# Patient Record
Sex: MALE | Race: WHITE | NOT HISPANIC OR LATINO | URBAN - METROPOLITAN AREA
[De-identification: names, ages, dates, MRNs, and addresses within clinical notes are randomized per-mention and may not be internally consistent; named-entity substitution may affect disease eponyms.]

---

## 2017-11-29 ENCOUNTER — EMERGENCY (EMERGENCY)
Facility: HOSPITAL | Age: 32
LOS: 1 days | Discharge: HOME | End: 2017-11-29
Attending: EMERGENCY MEDICINE
Payer: COMMERCIAL

## 2017-11-29 VITALS
HEART RATE: 82 BPM | HEIGHT: 70 IN | SYSTOLIC BLOOD PRESSURE: 117 MMHG | TEMPERATURE: 99 F | RESPIRATION RATE: 18 BRPM | WEIGHT: 169.98 LBS | OXYGEN SATURATION: 99 % | DIASTOLIC BLOOD PRESSURE: 65 MMHG

## 2017-11-29 VITALS
DIASTOLIC BLOOD PRESSURE: 75 MMHG | HEART RATE: 62 BPM | OXYGEN SATURATION: 100 % | RESPIRATION RATE: 18 BRPM | TEMPERATURE: 98 F | SYSTOLIC BLOOD PRESSURE: 147 MMHG

## 2017-11-29 LAB
ALBUMIN SERPL ELPH-MCNC: 4.5 G/DL — SIGNIFICANT CHANGE UP (ref 3.3–5.2)
ALP SERPL-CCNC: 41 U/L — SIGNIFICANT CHANGE UP (ref 40–120)
ALT FLD-CCNC: 13 U/L — SIGNIFICANT CHANGE UP
ANION GAP SERPL CALC-SCNC: 13 MMOL/L — SIGNIFICANT CHANGE UP (ref 5–17)
AST SERPL-CCNC: 18 U/L — SIGNIFICANT CHANGE UP
BASOPHILS # BLD AUTO: 0 K/UL — SIGNIFICANT CHANGE UP (ref 0–0.2)
BASOPHILS NFR BLD AUTO: 0.1 % — SIGNIFICANT CHANGE UP (ref 0–2)
BILIRUB SERPL-MCNC: 0.8 MG/DL — SIGNIFICANT CHANGE UP (ref 0.4–2)
BUN SERPL-MCNC: 12 MG/DL — SIGNIFICANT CHANGE UP (ref 8–20)
CALCIUM SERPL-MCNC: 9.6 MG/DL — SIGNIFICANT CHANGE UP (ref 8.6–10.2)
CHLORIDE SERPL-SCNC: 99 MMOL/L — SIGNIFICANT CHANGE UP (ref 98–107)
CO2 SERPL-SCNC: 29 MMOL/L — SIGNIFICANT CHANGE UP (ref 22–29)
CREAT SERPL-MCNC: 1.03 MG/DL — SIGNIFICANT CHANGE UP (ref 0.5–1.3)
EOSINOPHIL # BLD AUTO: 0 K/UL — SIGNIFICANT CHANGE UP (ref 0–0.5)
EOSINOPHIL NFR BLD AUTO: 0.3 % — SIGNIFICANT CHANGE UP (ref 0–5)
GLUCOSE SERPL-MCNC: 126 MG/DL — HIGH (ref 70–115)
HCT VFR BLD CALC: 40 % — LOW (ref 42–52)
HGB BLD-MCNC: 13.6 G/DL — LOW (ref 14–18)
LYMPHOCYTES # BLD AUTO: 1.5 K/UL — SIGNIFICANT CHANGE UP (ref 1–4.8)
LYMPHOCYTES # BLD AUTO: 15.9 % — LOW (ref 20–55)
MCHC RBC-ENTMCNC: 29.3 PG — SIGNIFICANT CHANGE UP (ref 27–31)
MCHC RBC-ENTMCNC: 34 G/DL — SIGNIFICANT CHANGE UP (ref 32–36)
MCV RBC AUTO: 86.2 FL — SIGNIFICANT CHANGE UP (ref 80–94)
MONOCYTES # BLD AUTO: 0.8 K/UL — SIGNIFICANT CHANGE UP (ref 0–0.8)
MONOCYTES NFR BLD AUTO: 8.7 % — SIGNIFICANT CHANGE UP (ref 3–10)
NEUTROPHILS # BLD AUTO: 6.9 K/UL — SIGNIFICANT CHANGE UP (ref 1.8–8)
NEUTROPHILS NFR BLD AUTO: 74.8 % — HIGH (ref 37–73)
PLATELET # BLD AUTO: 276 K/UL — SIGNIFICANT CHANGE UP (ref 150–400)
POTASSIUM SERPL-MCNC: 4.1 MMOL/L — SIGNIFICANT CHANGE UP (ref 3.5–5.3)
POTASSIUM SERPL-SCNC: 4.1 MMOL/L — SIGNIFICANT CHANGE UP (ref 3.5–5.3)
PROT SERPL-MCNC: 7.6 G/DL — SIGNIFICANT CHANGE UP (ref 6.6–8.7)
RAPID RVP RESULT: SIGNIFICANT CHANGE UP
RBC # BLD: 4.64 M/UL — SIGNIFICANT CHANGE UP (ref 4.6–6.2)
RBC # FLD: 12.9 % — SIGNIFICANT CHANGE UP (ref 11–15.6)
SODIUM SERPL-SCNC: 141 MMOL/L — SIGNIFICANT CHANGE UP (ref 135–145)
WBC # BLD: 9.2 K/UL — SIGNIFICANT CHANGE UP (ref 4.8–10.8)
WBC # FLD AUTO: 9.2 K/UL — SIGNIFICANT CHANGE UP (ref 4.8–10.8)

## 2017-11-29 PROCEDURE — 87633 RESP VIRUS 12-25 TARGETS: CPT

## 2017-11-29 PROCEDURE — 87798 DETECT AGENT NOS DNA AMP: CPT

## 2017-11-29 PROCEDURE — 80053 COMPREHEN METABOLIC PANEL: CPT

## 2017-11-29 PROCEDURE — 99284 EMERGENCY DEPT VISIT MOD MDM: CPT

## 2017-11-29 PROCEDURE — 36415 COLL VENOUS BLD VENIPUNCTURE: CPT

## 2017-11-29 PROCEDURE — 96375 TX/PRO/DX INJ NEW DRUG ADDON: CPT

## 2017-11-29 PROCEDURE — 96374 THER/PROPH/DIAG INJ IV PUSH: CPT

## 2017-11-29 PROCEDURE — 99284 EMERGENCY DEPT VISIT MOD MDM: CPT | Mod: 25

## 2017-11-29 PROCEDURE — 87486 CHLMYD PNEUM DNA AMP PROBE: CPT

## 2017-11-29 PROCEDURE — 85027 COMPLETE CBC AUTOMATED: CPT

## 2017-11-29 PROCEDURE — 87581 M.PNEUMON DNA AMP PROBE: CPT

## 2017-11-29 RX ORDER — METOCLOPRAMIDE HCL 10 MG
10 TABLET ORAL ONCE
Qty: 0 | Refills: 0 | Status: COMPLETED | OUTPATIENT
Start: 2017-11-29 | End: 2017-11-29

## 2017-11-29 RX ORDER — SODIUM CHLORIDE 9 MG/ML
1000 INJECTION INTRAMUSCULAR; INTRAVENOUS; SUBCUTANEOUS ONCE
Qty: 0 | Refills: 0 | Status: COMPLETED | OUTPATIENT
Start: 2017-11-29 | End: 2017-11-29

## 2017-11-29 RX ORDER — DIAZEPAM 5 MG
5 TABLET ORAL ONCE
Qty: 0 | Refills: 0 | Status: DISCONTINUED | OUTPATIENT
Start: 2017-11-29 | End: 2017-11-29

## 2017-11-29 RX ORDER — ONDANSETRON 8 MG/1
4 TABLET, FILM COATED ORAL ONCE
Qty: 0 | Refills: 0 | Status: COMPLETED | OUTPATIENT
Start: 2017-11-29 | End: 2017-11-29

## 2017-11-29 RX ADMIN — Medication 104 MILLIGRAM(S): at 19:31

## 2017-11-29 RX ADMIN — Medication 5 MILLIGRAM(S): at 17:59

## 2017-11-29 RX ADMIN — ONDANSETRON 4 MILLIGRAM(S): 8 TABLET, FILM COATED ORAL at 18:03

## 2017-11-29 RX ADMIN — SODIUM CHLORIDE 2000 MILLILITER(S): 9 INJECTION INTRAMUSCULAR; INTRAVENOUS; SUBCUTANEOUS at 18:00

## 2017-11-29 NOTE — ED STATDOCS - ATTENDING CONTRIBUTION TO CARE
I, Dr. Christopher, performed the initial face to face bedside interview with this patient regarding history of present illness, review of symptoms and relevant past medical, social and family history.  I completed an independent physical examination.  I was the initial provider who evaluated this patient. I have signed out the follow up of any pending tests (i.e. labs, radiological studies) to the ACP.  I have communicated the patient’s plan of care and disposition with the ACP.

## 2017-11-29 NOTE — ED ADULT NURSE NOTE - CHIEF COMPLAINT QUOTE
headache since Sunday and fever.  Pt had a CT of brain yesterday and states it was negative. Is on Augmention for Sinus infection.

## 2017-11-29 NOTE — ED STATDOCS - OBJECTIVE STATEMENT
33 y/o M pt presents to ED c/o a worsening HA with a fever, chills, body aches and nausea that onset 4 days ago. The pt rates his HA a 7/10. He states that it all started when he coughed and began to feel pressure on his head. His HA became unbearable two days ago. The pts HA worsens when he looks down. Pt states he has had HAs in the past. He states he had a CT done yesterday that ws negative. Took Excedrin yesterday with no relief. Currently on Augmentin since yesterday. Has been around sick contact. NKDA. Denies ear pain, V/D, fever, chills, SOB, CP, and abdominal pain. No further complaints at this time.

## 2017-11-29 NOTE — ED STATDOCS - PROGRESS NOTE DETAILS
pt states tht he's feeling better after medication PA NOTE: Pt seen by intake physician and hpi/orders/plan reviewed. PT presenting to ED with complaints of headache and body aches with low grade fever x 4 days.  patient states that he was prescribed augmentin by his pmd - took 2 days worth with no relief.  Denies vomiting.  Pt had negative CT scan yesterday  PE: GEN: Awake, alert,  NAD,  EYES: PERRL CARDIAC: Reg rate and rhythm, S1,S2, RRR  RESP: No distress noted. Lungs CTA bilaterally no wheeze, ronchi, rales. ABD: soft,  non-tender, no guarding. . NEURO: AOx3, no focal deficits   PLAN: labs, medicate

## 2017-11-29 NOTE — ED ADULT NURSE NOTE - OBJECTIVE STATEMENT
Pt c/o severe migraines, lower back and neck pain since sunday. C/o fevers, chills, insomnia. Took excedrin without relief

## 2017-11-29 NOTE — ED STATDOCS - NEUROLOGICAL, MLM
sensation is normal and strength is normal. sensation is normal and strength is normal. Normal gait.

## 2019-04-23 PROBLEM — Z00.00 ENCOUNTER FOR PREVENTIVE HEALTH EXAMINATION: Status: ACTIVE | Noted: 2019-04-23

## 2019-04-24 ENCOUNTER — APPOINTMENT (OUTPATIENT)
Dept: ORTHOPEDIC SURGERY | Facility: CLINIC | Age: 34
End: 2019-04-24
Payer: COMMERCIAL

## 2019-04-24 VITALS
WEIGHT: 170 LBS | SYSTOLIC BLOOD PRESSURE: 122 MMHG | HEIGHT: 70 IN | HEART RATE: 66 BPM | DIASTOLIC BLOOD PRESSURE: 80 MMHG | BODY MASS INDEX: 24.34 KG/M2

## 2019-04-24 DIAGNOSIS — S83.241A OTHER TEAR OF MEDIAL MENISCUS, CURRENT INJURY, RIGHT KNEE, INITIAL ENCOUNTER: ICD-10-CM

## 2019-04-24 DIAGNOSIS — Z78.9 OTHER SPECIFIED HEALTH STATUS: ICD-10-CM

## 2019-04-24 PROCEDURE — 99204 OFFICE O/P NEW MOD 45 MIN: CPT

## 2019-04-24 PROCEDURE — 73562 X-RAY EXAM OF KNEE 3: CPT

## 2019-04-24 NOTE — PHYSICAL EXAM
[Normal] : Gait: normal [LE] : Sensory: Intact in bilateral lower extremities [ALL] : Biceps, brachioradialis, triceps, patellar, ankle and plantar 2+ and symmetric bilaterally [Antalgic] : not antalgic [de-identified] : Right knee exam shows medial jointline tenderness, no lateral jointline tenderness. Pain at the adductor tubercle of the medial knee. negative Lachman's maneuver. Preserved ROM.\par Left knee exam shows negative Lachman's maneuver.  [de-identified] : GENERAL APPEARANCE: Well nourished and hydrated, pleasant, alert, and oriented x 3. Appears their stated age. \par HEENT: Normocephalic, extraocular eye motion intact. Nasal septum midline. Oral cavity clear. External auditory canal clear. \par RESPIRATORY: Breath sounds clear and audible in all lobes. No wheezing, No accessory muscle use.\par CARDIOVASCULAR: No apparent abnormalities. No lower leg edema. No varicosities. Pedal pulses are palpable.\par NEUROLOGIC: Sensation is normal, no muscle weakness in the upper or lower extremities.\par DERMATOLOGIC: No apparent skin lesions, moist, warm, no rash.\par SPINE: Cervical spine appears normal and moves freely; thoracic spine appears normal and moves freely; lumbosacral spine appears normal and moves freely, normal, nontender.\par MUSCULOSKELETAL: Hands, wrists, and elbows are normal and move freely, shoulders are normal and move freely.  [de-identified] : 3V Xray of the right knee done in office today and reviewed by Dr. Yordan Osei demonstrates well-preserved joint without findings or any evidence of fracture, dislocation, or any other acute orthopedic pathology. There is no radiographic features of severe OA present. \par \par \par

## 2019-04-24 NOTE — ADDENDUM
[FreeTextEntry1] : I, Saturnino Hunter, acted solely as a scribe for Dr. Yordan Osei on this date 04/24/2019.

## 2019-04-24 NOTE — DISCUSSION/SUMMARY
[de-identified] : 34 year male presents with right knee pain following recent injuries. We discussed the nature of the condition and the treatment options. His pain has persisted despite treatment with NSAIDs, rest, and knee brace usage. \par \par Based on his recent injuries and persistent right knee pain, I ordered an MRI of the right knee to evaluate internal derangement, rule out MCL tear or meniscus tear. \par \par The patient will follow-up to discuss MRI results when they are available. \par \par Currently he is unable to return to work, pending MRI result.
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
## 2019-09-21 ENCOUNTER — EMERGENCY (EMERGENCY)
Facility: HOSPITAL | Age: 34
LOS: 1 days | Discharge: HOME | End: 2019-09-21
Attending: EMERGENCY MEDICINE
Payer: COMMERCIAL

## 2019-09-21 VITALS
HEART RATE: 53 BPM | SYSTOLIC BLOOD PRESSURE: 116 MMHG | RESPIRATION RATE: 18 BRPM | DIASTOLIC BLOOD PRESSURE: 73 MMHG | TEMPERATURE: 98 F | OXYGEN SATURATION: 100 %

## 2019-09-21 VITALS — HEIGHT: 70 IN | WEIGHT: 169.98 LBS

## 2019-09-21 PROCEDURE — 70450 CT HEAD/BRAIN W/O DYE: CPT | Mod: 26

## 2019-09-21 PROCEDURE — 70450 CT HEAD/BRAIN W/O DYE: CPT

## 2019-09-21 PROCEDURE — 73030 X-RAY EXAM OF SHOULDER: CPT

## 2019-09-21 PROCEDURE — 99284 EMERGENCY DEPT VISIT MOD MDM: CPT

## 2019-09-21 PROCEDURE — 73030 X-RAY EXAM OF SHOULDER: CPT | Mod: 26,LT

## 2019-09-21 PROCEDURE — 73000 X-RAY EXAM OF COLLAR BONE: CPT | Mod: 26,LT

## 2019-09-21 PROCEDURE — 73000 X-RAY EXAM OF COLLAR BONE: CPT

## 2019-09-21 PROCEDURE — 99284 EMERGENCY DEPT VISIT MOD MDM: CPT | Mod: 25

## 2019-09-21 NOTE — ED STATDOCS - MUSCULOSKELETAL FINDINGS, MLM
tenderness to clavicle and left shoulder, no deformities, limited ROM due to pain, no midline cervical tenderness
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
## 2019-09-21 NOTE — ED STATDOCS - PATIENT PORTAL LINK FT
You can access the FollowMyHealth Patient Portal offered by [facility] by registering at the following website: [email or web address]. By joining [employer]’s FollowMyHealth portal, you will also be able to view your health information using other applications (apps) compatible with our system.

## 2019-09-21 NOTE — ED STATDOCS - PROGRESS NOTE DETAILS
Pts CT scan negative, Xrays left shoulder/clavicle negative. Pt with TTP over the AC joint, likely mild  shoulder. Pt stable for d/c with sling and f/u with ortho outpatient.

## 2019-09-23 ENCOUNTER — APPOINTMENT (OUTPATIENT)
Dept: ORTHOPEDIC SURGERY | Facility: CLINIC | Age: 34
End: 2019-09-23
Payer: COMMERCIAL

## 2019-09-23 VITALS
WEIGHT: 170 LBS | SYSTOLIC BLOOD PRESSURE: 109 MMHG | BODY MASS INDEX: 24.34 KG/M2 | HEIGHT: 70 IN | DIASTOLIC BLOOD PRESSURE: 71 MMHG | HEART RATE: 57 BPM

## 2019-09-23 PROCEDURE — 73000 X-RAY EXAM OF COLLAR BONE: CPT | Mod: RT

## 2019-09-23 PROCEDURE — 99214 OFFICE O/P EST MOD 30 MIN: CPT

## 2019-09-23 NOTE — DISCUSSION/SUMMARY
[de-identified] : MAXI is a 34 year old male who presents today for evaluation of left shoulder pain.  He has a history of multiple shoulder dislocations with his last dislocation being 3 years ago.  He performed physical therapy on his own and has been symptom free from that time.  Patient fell on the tip of his left shoulder on 9/21/19 and presented to [place] ED the same day.  X-rays revealed no fracture or dislocation.  He does not recall his shoulder dislocating or subluxing.  Currently, his pain is located to the lateral and superior part of his shoulder.  He denies numbness/tingling.  Patient takes Advil and Tylenol for pain and also utilizes ice.\par \par 1 view of the left and right clavicle performed today were reviewed with the pt and show no fracture. AC separation and SC subluxation noted to left shoulder with serendipity view. Otherwise unremarkable.\par \par After review of patients mechanism of injury, radiographs, based off his current symptoms and clinical exam, he is to remain within sling given to him today until next evaluation in 3 weeks. Nonweightbearing as well until further notice. We gave him a prescription

## 2019-09-23 NOTE — PHYSICAL EXAM
[de-identified] : Physical Exam:\par General: Well appearing, no acute distress\par Neurologic: A&Ox3, No focal deficits\par Head: NCAT without abrasions, lacerations, or ecchymosis to head, face, or scalp\par Eyes: No scleral icterus, no gross abnormalities\par Respiratory: Equal chest wall expansion bilaterally, no accessory muscle use\par Lymphatic: No lymphadenopathy palpated\par Skin: Warm and dry\par Psychiatric: Normal mood and affect\par \par Physical Exam:\par General: Well appearing, no acute distress\par Neurologic: A&Ox3, No focal deficits\par Head: NCAT without abrasions, lacerations, or ecchymosis to head, face, or scalp\par Eyes: No scleral icterus, no gross abnormalities\par Respiratory: Equal chest wall expansion bilaterally, no accessory muscle use\par Lymphatic: No lymphadenopathy palpated\par Skin: Warm and dry\par Psychiatric: Normal mood and affect\par \par Left Shoulder\par ·	Inspection/Palpation:Tenderness over AC joint and SC joint, no swelling or deformities, no ecchymosis\par ·	Range of Motion: unable due to pain and stiffness\par ·	Strength: unable due to pain and stiffness\par ·	Stability:  unable due to pain and stiffness\par Right Shoulder\par ·	Inspection/Palpation: no tenderness, swelling or deformities\par ·	Range of Motion: full and painless in all planes, no crepitus\par ·	Strength: forward elevation in scapular plane 5/5, internal rotation 5/5, external rotation 5/5, adduction 5/5 and abduction 5/5\par ·	Stability: no joint instability on provocative testing\par ·	Tests: Pritchard test negative, Neer sign negative, negative drop arm test secondary to pain, bear hug test negative, Napolean sign negative, cross arm adduction negative, lift off sign positive, hornblowers sign negative, speeds test negative, Yergason's test negative, no bicipital groove tenderness, Lopez's Active Compression test negative\par  [de-identified] : The following radiographs were ordered and read by me during this patients visit. I reviewed each radiograph in detail with the patient and discussed the findings as highlighted below. \par \par 2 views of the left and right clavicle show no fracture. AC separation and SC subluxation noted to left shoulder with serendipity view. Otherwise unremarkable.

## 2019-09-23 NOTE — HISTORY OF PRESENT ILLNESS
[de-identified] : MAXI is a 34 year old male who presents today for evaluation of left shoulder pain.  He has a history of multiple shoulder dislocations with his last dislocation being 3 years ago.  He performed physical therapy on his own and has been symptom free from that time.  Patient fell on the tip of his left shoulder on 9/21/19 and presented to [place] ED the same day.  X-rays revealed no fracture or dislocation.  He does not recall his shoulder dislocating or subluxing.  Currently, his pain is located to the lateral and superior part of his shoulder.  He denies numbness/tingling.  Patient takes Advil and Tylenol for pain and also utilizes ice.\par

## 2019-10-07 ENCOUNTER — APPOINTMENT (OUTPATIENT)
Dept: ORTHOPEDIC SURGERY | Facility: CLINIC | Age: 34
End: 2019-10-07
Payer: COMMERCIAL

## 2019-10-07 PROCEDURE — 73000 X-RAY EXAM OF COLLAR BONE: CPT

## 2019-10-07 PROCEDURE — 99213 OFFICE O/P EST LOW 20 MIN: CPT

## 2019-10-07 RX ORDER — MELOXICAM 7.5 MG/1
7.5 TABLET ORAL
Qty: 21 | Refills: 0 | Status: COMPLETED | COMMUNITY
Start: 2019-09-23 | End: 2019-10-28

## 2019-10-07 NOTE — PHYSICAL EXAM
[de-identified] : Physical Exam:\par General: Well appearing, no acute distress\par Neurologic: A&Ox3, No focal deficits\par Head: NCAT without abrasions, lacerations, or ecchymosis to head, face, or scalp\par Eyes: No scleral icterus, no gross abnormalities\par Respiratory: Equal chest wall expansion bilaterally, no accessory muscle use\par Lymphatic: No lymphadenopathy palpated\par Skin: Warm and dry\par Psychiatric: Normal mood and affect\par \par Physical Exam:\par General: Well appearing, no acute distress\par Neurologic: A&Ox3, No focal deficits\par Head: NCAT without abrasions, lacerations, or ecchymosis to head, face, or scalp\par Eyes: No scleral icterus, no gross abnormalities\par Respiratory: Equal chest wall expansion bilaterally, no accessory muscle use\par Lymphatic: No lymphadenopathy palpated\par Skin: Warm and dry\par Psychiatric: Normal mood and affect\par \par Left Shoulder\par ·	Inspection/Palpation:Tenderness over AC joint and SC joint, no swelling or deformities, no ecchymosis\par ·	Range of Motion: unable due to pain and stiffness\par ·	Strength: unable due to pain and stiffness\par ·	Stability:  unable due to pain and stiffness\par Right Shoulder\par ·	Inspection/Palpation: no tenderness, swelling or deformities\par ·	Range of Motion: full and painless in all planes, no crepitus\par ·	Strength: forward elevation in scapular plane 5/5, internal rotation 5/5, external rotation 5/5, adduction 5/5 and abduction 5/5\par ·	Stability: no joint instability on provocative testing\par ·	Tests: Pritchard test negative, Neer sign negative, negative drop arm test secondary to pain, bear hug test negative, Napolean sign negative, cross arm adduction negative, lift off sign positive, hornblowers sign negative, speeds test negative, Yergason's test negative, no bicipital groove tenderness, Lopez's Active Compression test negative\par  [de-identified] : The following radiographs were ordered and read by me during this patients visit. I reviewed each radiograph in detail with the patient and discussed the findings as highlighted below. \par \par 2 views of the left and right clavicle show no fracture. AC separation and SC subluxation noted to left shoulder with serendipity view. Otherwise unremarkable.

## 2019-10-07 NOTE — DISCUSSION/SUMMARY
[de-identified] : Symptoms of a fall today. Overall is doing much better. He still has weakness with full range of motion. He saw some mild pain with overhead activities. At this time I recommend physical therapy for strengthening and increase her range of motion. I gave him a note that he may return to work in 2 weeks. I will see him back in 4-6 weeks for clinical reassessment. He visit the above plan and all questions were answered.

## 2019-10-07 NOTE — HISTORY OF PRESENT ILLNESS
[de-identified] : MAXI is a 34 year old male who presents today for evaluation of left shoulder pain.  He has a history of multiple shoulder dislocations with his last dislocation being 3 years ago.  He performed physical therapy on his own and has been symptom free from that time.  Patient fell on the tip of his left shoulder on 9/21/19 and presented to [place] ED the same day.  X-rays revealed no fracture or dislocation.  He does not recall his shoulder dislocating or subluxing.  Currently, his pain is located to the lateral and superior part of his shoulder.  He denies numbness/tingling.  Patient takes Advil and Tylenol for pain and also utilizes ice.\par

## 2019-10-10 ENCOUNTER — APPOINTMENT (OUTPATIENT)
Dept: ORTHOPEDIC SURGERY | Facility: CLINIC | Age: 34
End: 2019-10-10

## 2019-10-21 ENCOUNTER — MOBILE ON CALL (OUTPATIENT)
Age: 34
End: 2019-10-21

## 2019-11-04 ENCOUNTER — RESULT REVIEW (OUTPATIENT)
Age: 34
End: 2019-11-04

## 2019-11-04 ENCOUNTER — APPOINTMENT (OUTPATIENT)
Dept: ORTHOPEDIC SURGERY | Facility: CLINIC | Age: 34
End: 2019-11-04
Payer: COMMERCIAL

## 2019-11-04 DIAGNOSIS — S43.202A UNSPECIFIED SUBLUXATION OF LEFT STERNOCLAVICULAR JOINT, INITIAL ENCOUNTER: ICD-10-CM

## 2019-11-04 PROCEDURE — 99213 OFFICE O/P EST LOW 20 MIN: CPT

## 2019-11-04 NOTE — HISTORY OF PRESENT ILLNESS
[Improving] : improving [de-identified] : MAXI is a 34 year old male who presents today for followup evaluation of left shoulder pain.  He has a history of multiple shoulder dislocations with his last dislocation being 3 years ago.  He performed physical therapy on his own and has been symptom free from that time.  Patient fell on the tip of his left shoulder on 9/21/19 and presented to [place] ED the same day.  X-rays revealed no fracture or dislocation.  He does not recall his shoulder dislocating or subluxing.  \par \par Currently the patient endorses improving medial clavicle pain.  He endorses pain with weight bearing through the left upper extremity.  His pain wraps around the outside of his shoulder and radiates to his deltoid tuberosity.He performs physical therapy.  Pt. obtained an MRI of his left shoulder and chest that reveals a nondisplaced medial clavicle fracture, a nondisplaced acromial fracture with AC ligament sprain, mild inferior glenoid cartilage fissuring and nondisplaced labral tear, and an infraspinatus tendon tear that measures 4 mm in thickness

## 2019-11-04 NOTE — PHYSICAL EXAM
[de-identified] : Physical Exam:\par General: Well appearing, no acute distress\par Neurologic: A&Ox3, No focal deficits\par Head: NCAT without abrasions, lacerations, or ecchymosis to head, face, or scalp\par Eyes: No scleral icterus, no gross abnormalities\par Respiratory: Equal chest wall expansion bilaterally, no accessory muscle use\par Lymphatic: No lymphadenopathy palpated\par Skin: Warm and dry\par Psychiatric: Normal mood and affect\par \par Physical Exam:\par General: Well appearing, no acute distress\par Neurologic: A&Ox3, No focal deficits\par Head: NCAT without abrasions, lacerations, or ecchymosis to head, face, or scalp\par Eyes: No scleral icterus, no gross abnormalities\par Respiratory: Equal chest wall expansion bilaterally, no accessory muscle use\par Lymphatic: No lymphadenopathy palpated\par Skin: Warm and dry\par Psychiatric: Normal mood and affect\par \par Left Shoulder\par ·	Inspection/Palpation:Tenderness over AC joint and SC joint, no swelling or deformities, no ecchymosis\par ·	Range of Motion: unable due to pain and stiffness\par ·	Strength: unable due to pain and stiffness\par ·	Stability: unable due to pain and stiffness\par Right Shoulder\par ·	Inspection/Palpation: no tenderness, swelling or deformities\par ·	Range of Motion: full and painless in all planes, no crepitus\par ·	Strength: forward elevation in scapular plane 5/5, internal rotation 5/5, external rotation 5/5, adduction 5/5 and abduction 5/5\par ·	Stability: no joint instability on provocative testing\par ·	Tests: Pritchard test negative, Neer sign negative, negative drop arm test secondary to pain, bear hug test negative, Napolean sign negative, cross arm adduction negative, lift off sign positive, hornblowers sign negative, speeds test negative, Yergason's test negative, no bicipital groove tenderness, Lopez's Active Compression test negative\par  [de-identified] : MRI performed at that facility is available for me to review. It shows a impaction fracture at the sternal end of the clavicle as well as the distal end of the acromion. There is a chronic partial-thickness tearing of the infraspinatus. No superior labral tear noted

## 2019-11-04 NOTE — REVIEW OF SYSTEMS
[Joint Pain] : joint pain [Joint Stiffness] : joint stiffness [Negative] : Heme/Lymph [FreeTextEntry9] : left shoulder

## 2019-11-04 NOTE — DISCUSSION/SUMMARY
[de-identified] : José Miguel comes in for followup to review his MRI. His pain is secondary to the fractures both at the sternal end of the clavicle as well as the acromion. After discussion with the patient regarding the nature of his symptoms as well as Ultram and options. We discussed operative and nonoperative management. At this time I recommend continued nonoperative care with a 2 week hiatus from physical therapy. I know was given until him that he may return to work in one week. He will have limited weight lifting restrictions of no more than 5 pounds below shoulder height and no weight above shoulder height. I will see him back in 4 weeks for clinical reassessment. He agrees with the above plan and all questions were answered.

## 2019-12-02 ENCOUNTER — APPOINTMENT (OUTPATIENT)
Dept: ORTHOPEDIC SURGERY | Facility: CLINIC | Age: 34
End: 2019-12-02
Payer: COMMERCIAL

## 2019-12-02 PROCEDURE — 99213 OFFICE O/P EST LOW 20 MIN: CPT

## 2019-12-02 NOTE — HISTORY OF PRESENT ILLNESS
[Improving] : improving [de-identified] : MAXI is a 34 year old male who presents today for followup evaluation of left shoulder pain.  He has a history of multiple shoulder dislocations with his last dislocation being 3 years ago.  He performed physical therapy on his own and has been symptom free from that time.  Patient fell on the tip of his left shoulder on 9/21/19 and presented to [facility] ED the same day.  X-rays revealed no fracture or dislocation.  He does not recall his shoulder dislocating or subluxing.  \par \par Currently the patient endorses improving medial clavicle pain. He has worsening pain going up his left lateral aspect of neck. He endorses pain with weight bearing through the left upper extremity.  His pain wraps around the outside of his shoulder and radiates to his deltoid tuberosity. He has not gone to physical therapy following his last visit due to pain.

## 2019-12-02 NOTE — PHYSICAL EXAM
[de-identified] : Physical Exam:\par General: Well appearing, no acute distress\par Neurologic: A&Ox3, No focal deficits\par Head: NCAT without abrasions, lacerations, or ecchymosis to head, face, or scalp\par Eyes: No scleral icterus, no gross abnormalities\par Respiratory: Equal chest wall expansion bilaterally, no accessory muscle use\par Lymphatic: No lymphadenopathy palpated\par Skin: Warm and dry\par Psychiatric: Normal mood and affect\par \par Left Shoulder\par ·	Inspection/Palpation: Tenderness over AC joint and SC joint, no swelling or deformities, no ecchymosis\par ·	Range of Motion: unable due to pain and stiffness\par ·	Strength: unable due to pain and stiffness\par ·	Stability: unable due to pain and stiffness\par Right Shoulder\par ·	Inspection/Palpation: no tenderness, swelling or deformities\par ·	Range of Motion: full and painless in all planes, no crepitus\par ·	Strength: forward elevation in scapular plane 5/5, internal rotation 5/5, external rotation 5/5, adduction 5/5 and abduction 5/5\par ·	Stability: no joint instability on provocative testing\par ·	Tests: Pritchard test negative, Neer sign negative, negative drop arm test secondary to pain, bear hug test negative, Napolean sign negative, cross arm adduction negative, lift off sign positive, hornblowers sign negative, speeds test negative, Yergason's test negative, no bicipital groove tenderness, Lopez's Active Compression test negative\par

## 2019-12-02 NOTE — DISCUSSION/SUMMARY
[de-identified] : She comes in for follow-up today.  Overall he is doing much better.  He states that the pain at his clavicular region has improved significantly.  He still has some pain over his trapezial and sternocleidomastoid muscles.  This is likely secondary to poor mechanics and the fact that he has not done physical therapy.  At this time given that it appears the clavicle is healing well I recommend physical therapy to retrain his rotator cuff muscles as well as trapezial muscle.  I will see him back in 6 weeks for clinical reassessment.  I also discussed with him what records he may may not do secondary to the injury and pain.  He demonstrates understanding of the plan.  All questions were answered.

## 2020-01-27 ENCOUNTER — APPOINTMENT (OUTPATIENT)
Dept: ORTHOPEDIC SURGERY | Facility: CLINIC | Age: 35
End: 2020-01-27
Payer: COMMERCIAL

## 2020-01-27 DIAGNOSIS — S43.102A UNSPECIFIED DISLOCATION OF LEFT ACROMIOCLAVICULAR JOINT, INITIAL ENCOUNTER: ICD-10-CM

## 2020-01-27 DIAGNOSIS — S42.025A NONDISPLACED FRACTURE OF SHAFT OF LEFT CLAVICLE, INITIAL ENCOUNTER FOR CLOSED FRACTURE: ICD-10-CM

## 2020-01-27 DIAGNOSIS — S83.411A SPRAIN OF MEDIAL COLLATERAL LIGAMENT OF RIGHT KNEE, INITIAL ENCOUNTER: ICD-10-CM

## 2020-01-27 PROCEDURE — 99213 OFFICE O/P EST LOW 20 MIN: CPT

## 2020-01-27 PROCEDURE — 73030 X-RAY EXAM OF SHOULDER: CPT | Mod: LT

## 2020-01-27 NOTE — HISTORY OF PRESENT ILLNESS
[Improving] : improving [de-identified] : MAXI is a 34 year old male who presents today for followup evaluation of left shoulder pain.  He has a history of multiple shoulder dislocations with his last dislocation being 3 years ago.  He performed physical therapy on his own and has been symptom free from that time.  Patient fell on the tip of his left shoulder on 9/21/19 and presented to [place] ED the same day.  X-rays revealed no fracture or dislocation.  He does not recall his shoulder dislocating or subluxing.  \par \par Currently, he is back at the gym doing his normal activities but avoiding bench pressing and denies pain unless in extreme flexion.

## 2020-01-27 NOTE — PHYSICAL EXAM
[de-identified] : Physical Exam:\par General: Well appearing, no acute distress\par Neurologic: A&Ox3, No focal deficits\par Head: NCAT without abrasions, lacerations, or ecchymosis to head, face, or scalp\par Eyes: No scleral icterus, no gross abnormalities\par Respiratory: Equal chest wall expansion bilaterally, no accessory muscle use\par Lymphatic: No lymphadenopathy palpated\par Skin: Warm and dry\par Psychiatric: Normal mood and affect\par \par Left Shoulder\par ·	Inspection/Palpation: No tenderness over AC joint and SC joint, no swelling or deformities, no ecchymosis\par ·	Range of Motion: no crepitus with ROM; Active/Passive FF 0-160; ER at side 0-45; IR to T7 \par ·	Strength: forward elevation in scapular plane [4/5], internal rotation [4/5], external rotation [4/5], adduction [4/5] and abduction [4/5]\par ·	Stability: no joint instability on provocative testing\par ·	Tests: Pritchard test negative, Neer negative, drop arm test negative, bear hug test negative, Napolean sign negative, cross arm adduction negative, lift off sign negative, hornblowers sign negative, speeds test NEG, Yergason's test NEG, no bicipital groove tenderness, Lopez's Active Compression test POS, whipple test NEG, bicep's load II test negative\par \par Right Shoulder\par ·	Inspection/Palpation: no tenderness, swelling or deformities\par ·	Range of Motion: full and painless in all planes, no crepitus\par ·	Strength: forward elevation in scapular plane 5/5, internal rotation 5/5, external rotation 5/5, adduction 5/5 and abduction 5/5\par ·	Stability: no joint instability on provocative testing\par ·	Tests: Pritchard test negative, Neer sign negative, negative drop arm test secondary to pain, bear hug test negative, Napolean sign negative, cross arm adduction negative, lift off sign positive, hornblowers sign negative, speeds test negative, Yergason's test negative, no bicipital groove tenderness, Lopez's Active Compression test negative\par  [de-identified] : The following radiographs were ordered and read by me during this patients visit. I reviewed each radiograph in detail with the patient and discussed the findings as highlighted below. \par \par AP and Serendipity views of clavicle show no fracture, with callus formation noted over the sternal end, no dislocation or bony lesions. There is no evidence of degenerative change to acromioclavicular joint with maintenance of the joint space. Otherwise unremarkable.

## 2020-01-27 NOTE — DISCUSSION/SUMMARY
[de-identified] : José Miguel comes in for follow-up of his left shoulder pain.  Overall is doing very well.  He is back to doing all activities.  He does complain of some mild stiffness of the left shoulder.  At this time I recommend continue physical therapy while doing a home exercise program on his own.  I provided him with a home exercise program as well.  I will see him back as needed.  He agrees with the above plan and all questions were answered.

## 2021-03-09 ENCOUNTER — TRANSCRIPTION ENCOUNTER (OUTPATIENT)
Age: 36
End: 2021-03-09

## 2022-03-01 ENCOUNTER — APPOINTMENT (OUTPATIENT)
Dept: PHYSICAL MEDICINE AND REHAB | Facility: CLINIC | Age: 37
End: 2022-03-01
Payer: COMMERCIAL

## 2022-03-01 VITALS
BODY MASS INDEX: 24.34 KG/M2 | HEIGHT: 70 IN | HEART RATE: 78 BPM | DIASTOLIC BLOOD PRESSURE: 84 MMHG | SYSTOLIC BLOOD PRESSURE: 120 MMHG | WEIGHT: 170 LBS | RESPIRATION RATE: 12 BRPM

## 2022-03-01 DIAGNOSIS — Z78.9 OTHER SPECIFIED HEALTH STATUS: ICD-10-CM

## 2022-03-01 DIAGNOSIS — M50.90 CERVICAL DISC DISORDER, UNSPECIFIED, UNSPECIFIED CERVICAL REGION: ICD-10-CM

## 2022-03-01 PROCEDURE — 99204 OFFICE O/P NEW MOD 45 MIN: CPT

## 2022-03-01 NOTE — HISTORY OF PRESENT ILLNESS
[FreeTextEntry1] : 37 y.o. M w/ h/o multiple left shoulder dislocations -> fall sustaining left clavicle fx and AC separation (2019) presents to office w/ c/o right shoulder and neck pain.  Pt. describes 2 week h/o pain radiating into right sided neck and down into biceps.  Denies N/T/W in right hand.  Pt. has been taking Rx NSAIDs which have been helpful.  MRI C-spine done and reviewed below.  No recent P.T. yet.  No KASH or shoulder injections.

## 2022-03-01 NOTE — PHYSICAL EXAM
[FreeTextEntry1] : NAD\par A&Ox3\par Non Obese\par Cervical ROM: 30' extension, flexion- able to touch 1 finger breadth, 60-70' b/l LR \par ROM Right Shoulder: 180' ABD/FF w/o pain, Left shoulder: 170' ABD/FF w/o pain \par DTR: 2+ b/l biceps,triceps, BR\par Duran's neg\par Neer's: neg \par Hawkin's: neg\par Scarf: neg\par Spurling: neg \par MMT: 5/5 b/l  SS; IS; D; 5/5 B/T/WrExt\par Palpation: TTP R>L upper trapezius, periscapular, cervical paraspinals \par

## 2022-03-01 NOTE — DATA REVIEWED
[MRI] : MRI [FreeTextEntry1] : MRI C-spine (Feb 2022): Mild disc bulging at C4-5 with focal central annular tear. No thecal sac compression. No neural foraminal stenosis.\par \par Left paracentral disc osteophyte complex at C5-6. Focal right paracentral annular tear. Mild thecal sac compression with flattening of left ventral spinal cord. Mild left neural foraminal stenosis.\par \par Shallow central to right paracentral disc protrusion at C6-7 with focal right paracentral annular tear. Mild thecal sac compression. No neural foraminal stenosis.\par \par Straightening of normal cervical lordosis which may reflect muscle spasm or patient positioning.

## 2022-03-01 NOTE — ASSESSMENT
[FreeTextEntry1] : 37 y.o. M w/ right sided neck and shoulder pain.  I spent most of today's office visit (35 min) reviewing the patient's MRI, discussing pathogenesis and further non-operative management.  Advised judicious use of PO NSAIDs 2' GI/cardiac/renal toxicities.  Rx P.T. for modalities, gentle ROM, stretching and strengthening exercises.  No need for KASH or shoulder injections now.  Pt. is in agreement with plan.  All questions answered.  RTC 6-8 weeks.

## 2022-03-07 ENCOUNTER — APPOINTMENT (OUTPATIENT)
Dept: ORTHOPEDIC SURGERY | Facility: CLINIC | Age: 37
End: 2022-03-07

## 2022-03-10 ENCOUNTER — APPOINTMENT (OUTPATIENT)
Dept: ORTHOPEDIC SURGERY | Facility: CLINIC | Age: 37
End: 2022-03-10

## 2022-09-22 ENCOUNTER — APPOINTMENT (OUTPATIENT)
Dept: PHYSICAL MEDICINE AND REHAB | Facility: CLINIC | Age: 37
End: 2022-09-22

## 2022-09-22 ENCOUNTER — FORM ENCOUNTER (OUTPATIENT)
Age: 37
End: 2022-09-22

## 2022-09-22 VITALS
RESPIRATION RATE: 12 BRPM | HEIGHT: 71 IN | BODY MASS INDEX: 23.8 KG/M2 | SYSTOLIC BLOOD PRESSURE: 109 MMHG | DIASTOLIC BLOOD PRESSURE: 70 MMHG | WEIGHT: 170 LBS | HEART RATE: 62 BPM

## 2022-09-22 PROCEDURE — 99214 OFFICE O/P EST MOD 30 MIN: CPT

## 2022-09-22 PROCEDURE — 99072 ADDL SUPL MATRL&STAF TM PHE: CPT

## 2022-09-22 NOTE — PHYSICAL EXAM
[FreeTextEntry1] : NAD\par A&Ox3\par Non-obese\par ROM L-spine: full forward flexion; 20' extension w/o pain\par ROM Hips: \par Pelvic tilt:\par Seated slump test: neg left\par SLR: neg left\par LICHA's: +/- left\par DTR's: 2+ knees/ankles\par MMT: 5/5 b/l LE\par Sensation:\par Toe & Heel Walk: Yes\par Palpation: left mid and upper lumbar paravertebral hypertonicity\par

## 2022-09-22 NOTE — ASSESSMENT
[FreeTextEntry1] : 37 y.o. M w/ c/o left sided LBP after a work-related injury on 8/22/22.  I spent most of today's office visit (40 min) reviewing the patient's MRI, discussing etiology, pathogenesis and further non-operative management.  MRI c/w broad-based left paracentral/foraminal HNP impinging on the left L2 NR.  Note is made of a left-sided annular tear.  Explained that imaging finding may be incidental as he does not c/o groin or medial thigh pain w/ N/T/B.  I would not recommend TFESI at this time.  Rx P.T. for modalities, gentle ROM, stretching and strengthening exercises.  Advised judicious use of PO NSAIDs.  May reserve SMR for night time use only.  Pt. will remain out of work for additional 3 weeks on FMLA leave.  Pt. is in agreement with plan.  All questions answered.  RTC 6-8 weeks.    [Indicate if, in your opinion, the incident that the patient described was the competent medical cause of this injury/illness.] : The incident that the patient described was the competent medical cause of this injury/illness: Yes [Indicate if the patient's complaints are consistent with his/her history of the injury/illness.] : Indicate if the patient's complaints are consistent with his/her history of the injury/illness: Yes [Yes] : Yes, it is consistent [Can the patient return to usual work activities as indicated? If yes, indicate date___] : The patient cannot return to usual work activities as indicated. [FreeTextEntry5] : 100 [Physical Disability Temporary Total] : temporarily total disabled

## 2022-09-22 NOTE — DATA REVIEWED
[MRI] : MRI [FreeTextEntry1] : MRI L-spine (\par \par T12-L1: There is no significant spinal canal or neural foraminal stenosis.\par \par L1-L2: There is no significant spinal canal or neural foraminal stenosis.\par \par L2-L3 : There is a broad-based left paracentral/foraminal disc protrusion\par impinging upon the left L2 nerve roots. A left-sided annular tear is noted.\par There is mild left lateral recess stenosis with disc material contacting but not\par displacing the left L3 nerve root.\par \par L3-L4 : There is no significant spinal canal or neural foraminal stenosis.\par \par L4-L5: There is a circumferential disc bulge flattening the ventral thecal sac\par and minimally narrowing the right greater than left neural foramen.\par \par L5-S1: There is no significant spinal canal or neural foraminal stenosis. Mild\par facet arthrosis is noted.\par \par The posterior paraspinal muscles are symmetric.

## 2022-09-22 NOTE — HISTORY OF PRESENT ILLNESS
[FreeTextEntry1] : 37 y.o. M presents to office w/ c/o LBP after a work-related injury on 22.  Pt. states that he was unloading an oversize pallet and acutely injured his lower back.  Pt. denies h/o LBP prior to this accident.  PMD obtained x-rays and MRI L-spine.  Results detailed below.  Pain can radiate into left posterior superior hip not down leg.  Denies N/T/W in thigh, leg or foot.  Pt. attempted to RTW light duty on 22 but was unable to work without pain.  Then took [facility] leave of absence to care for  baby.  Has been taking OTC ibuprofen and was Rx'd SMR which causes excessive daytime drowsiness.  Has not had P.T. or injections.

## 2022-09-22 NOTE — HISTORY OF PRESENT ILLNESS
[FreeTextEntry1] : 37 y.o. M presents to office w/ c/o LBP after a work-related injury on 22.  Pt. states that he was unloading an oversize pallet and acutely injured his lower back.  Pt. denies h/o LBP prior to this accident.  PMD obtained x-rays and MRI L-spine.  Results detailed below.  Pain can radiate into left posterior superior hip not down leg.  Denies N/T/W in thigh, leg or foot.  Pt. attempted to RTW light duty on 22 but was unable to work without pain.  Then took [facility] leave of absence to care for  baby.  Has been taking OTC ibuprofen and was Rx'd SMR which causes excessive daytime drowsiness.  Has not had P.T. or injections.

## 2022-10-10 ENCOUNTER — FORM ENCOUNTER (OUTPATIENT)
Age: 37
End: 2022-10-10

## 2022-10-14 ENCOUNTER — NON-APPOINTMENT (OUTPATIENT)
Age: 37
End: 2022-10-14

## 2022-10-18 ENCOUNTER — APPOINTMENT (OUTPATIENT)
Dept: PHYSICAL MEDICINE AND REHAB | Facility: CLINIC | Age: 37
End: 2022-10-18

## 2022-10-18 VITALS
WEIGHT: 170 LBS | HEART RATE: 69 BPM | HEIGHT: 71 IN | DIASTOLIC BLOOD PRESSURE: 75 MMHG | SYSTOLIC BLOOD PRESSURE: 134 MMHG | BODY MASS INDEX: 23.8 KG/M2 | RESPIRATION RATE: 14 BRPM

## 2022-10-18 PROCEDURE — 99214 OFFICE O/P EST MOD 30 MIN: CPT

## 2022-10-18 PROCEDURE — 99072 ADDL SUPL MATRL&STAF TM PHE: CPT

## 2022-10-18 NOTE — ASSESSMENT
[Indicate if, in your opinion, the incident that the patient described was the competent medical cause of this injury/illness.] : The incident that the patient described was the competent medical cause of this injury/illness: Yes [Indicate if the patient's complaints are consistent with his/her history of the injury/illness.] : Indicate if the patient's complaints are consistent with his/her history of the injury/illness: Yes [Yes] : Yes, it is consistent [Physical Disability Temporary Total] : temporarily total disabled [FreeTextEntry1] : 37 y.o. M w/ c/o left sided LBP after a work-related injury (8/22/22) w/ persistent muscular discomfort left lumbar spine.  I spent most of today's office visit (25 min) re-reviewing the patient's MRI, discussing etiology, pathogenesis and further non-operative management.  MRI previously reviewed c/w broad-based left paracentral/foraminal HNP impinging on the left L2 NR.  Note again is made of a left-sided annular tear.  Explained that imaging finding may be incidental as he does not c/o groin or medial thigh pain w/ N/T/B.  That does not mean; however, that his myofascial back pain does not need to be treated in P.T. at an optimal frequency of 2-3x/week for 6-8 weeks.  May reserve SMR for night time use only.  Pt. will remain out of work for additional 5 weeks as there are no light duty options available to him at his current job.  Pt. may require FCE to help determine RTW restriction and/or accommodations.  Pt. is in agreement with plan.  All questions answered.  RTC 5 weeks.    [Can the patient return to usual work activities as indicated? If yes, indicate date___] : The patient cannot return to usual work activities as indicated. [FreeTextEntry5] : 100

## 2022-10-18 NOTE — PHYSICAL EXAM
[FreeTextEntry1] : NAD\par A&Ox3\par Non-obese\par No significant change in today's office visit\par ROM L-spine: full forward flexion; 20' extension w/o pain\par ROM Hips: \par Pelvic tilt:\par Seated slump test: neg left\par SLR: neg left\par LICHA's: +/- left (unchanged)\par DTR's: 2+ knees/ankles\par MMT: 5/5 b/l LE\par Sensation: SILT\par Toe & Heel Walk: Yes\par Palpation: left mid and upper lumbar paravertebral hypertonicity/spasm/ttp (largely unchaged)\par

## 2022-10-18 NOTE — HISTORY OF PRESENT ILLNESS
[FreeTextEntry1] : 37 y.o. M w/ c/o left sided LBP after a work-related injury (8/22/22) returns to office for f/u.  Pt. states that his WC carrier initially approved 12 visits of P.T. now only 6 visits.  Pt. only has completed 2 courses of P.T. now.  He describes "soreness" across his lower back w/ exacerbations of pain radiating into left lateral hip w/ extremes of motion.  Denies groin or medial thigh pain.

## 2022-11-13 ENCOUNTER — FORM ENCOUNTER (OUTPATIENT)
Age: 37
End: 2022-11-13

## 2022-11-14 ENCOUNTER — APPOINTMENT (OUTPATIENT)
Dept: PHYSICAL MEDICINE AND REHAB | Facility: CLINIC | Age: 37
End: 2022-11-14

## 2022-11-14 VITALS
BODY MASS INDEX: 23.8 KG/M2 | HEIGHT: 71 IN | WEIGHT: 170 LBS | DIASTOLIC BLOOD PRESSURE: 75 MMHG | HEART RATE: 71 BPM | SYSTOLIC BLOOD PRESSURE: 119 MMHG

## 2022-11-14 PROCEDURE — 99072 ADDL SUPL MATRL&STAF TM PHE: CPT

## 2022-11-14 PROCEDURE — 99214 OFFICE O/P EST MOD 30 MIN: CPT

## 2022-11-14 NOTE — ASSESSMENT
[Indicate if, in your opinion, the incident that the patient described was the competent medical cause of this injury/illness.] : The incident that the patient described was the competent medical cause of this injury/illness: Yes [Indicate if the patient's complaints are consistent with his/her history of the injury/illness.] : Indicate if the patient's complaints are consistent with his/her history of the injury/illness: Yes [Yes] : Yes, it is consistent [Physical Disability Temporary Total] : temporarily total disabled [FreeTextEntry1] : 37 y.o. M w/ c/o left sided LBP after a work-related injury (8/22/22) w/ persistent muscular discomfort left lumbar spine.  I spent most of today's office visit (25 min) discussing etiology, pathogenesis and further non-operative management.  MRI previously reviewed c/w broad-based left paracentral/foraminal HNP impinging on the left L2 NR.  Note again is made of a left-sided annular tear.  Explained that imaging finding may be incidental as he does not c/o groin or medial thigh pain w/ N/T/B.  That does not mean; however, that his myofascial back pain does not need to be treated in P.T. at an optimal frequency of 2-3x/week for 6-8 weeks.  Pt. will remain out of work for additional 4 weeks to complete a functional capacity evaluation (FCE) to help determine RTW restriction and/or accommodations.  Pt. is in agreement with plan.  All questions answered.  RTC 4 weeks.    [Can the patient return to usual work activities as indicated? If yes, indicate date___] : The patient cannot return to usual work activities as indicated. [FreeTextEntry5] : 100

## 2022-11-14 NOTE — PHYSICAL EXAM
[FreeTextEntry1] : NAD\par A&Ox3\par Non-obese\par No significant change in today's office visit\par ROM L-spine: near full forward flexion; 15-20' extension w/ pain (new)\par ROM Hips: near full IR/ER w/o pain (tight in left hip IR)\par Pelvic tilt: minimal\par Seated slump test: neg left\par SLR: neg left\par LICHA's: +/- left (unchanged)\par DTR's: 2+ knees/ankles\par MMT: 5/5 b/l LE\par Sensation: SILT\par Toe & Heel Walk: Yes\par Palpation: left mid and upper lumbar paravertebral hypertonicity/spasm/ttp (largely unchanged)\par

## 2022-11-14 NOTE — DATA REVIEWED
[MRI] : MRI [FreeTextEntry1] : MRI L-spine \par \par T12-L1: There is no significant spinal canal or neural foraminal stenosis.\par \par L1-L2: There is no significant spinal canal or neural foraminal stenosis.\par \par L2-L3 : There is a broad-based left paracentral/foraminal disc protrusion\par impinging upon the left L2 nerve roots. A left-sided annular tear is noted.\par There is mild left lateral recess stenosis with disc material contacting but not\par displacing the left L3 nerve root.\par \par L3-L4 : There is no significant spinal canal or neural foraminal stenosis.\par \par L4-L5: There is a circumferential disc bulge flattening the ventral thecal sac\par and minimally narrowing the right greater than left neural foramen.\par \par L5-S1: There is no significant spinal canal or neural foraminal stenosis. Mild\par facet arthrosis is noted.\par \par The posterior paraspinal muscles are symmetric.

## 2022-11-14 NOTE — HISTORY OF PRESENT ILLNESS
[FreeTextEntry1] : 37 y.o. M  w/ c/o left sided LBP after a work-related injury (8/22/22) w/ persistent muscular discomfort left lumbar spine returns to office for f/u.  Pt. has not been able to go to P.T. since his last visit.  Pain is now on both sided of his lower back w/ radiation into left hip.  He has pain bending over to  his young son.  Pt. takes OTC ibuprofen which causes GI upset.  Has not been able to RTW yet.

## 2022-12-12 ENCOUNTER — APPOINTMENT (OUTPATIENT)
Dept: PHYSICAL MEDICINE AND REHAB | Facility: CLINIC | Age: 37
End: 2022-12-12

## 2022-12-12 VITALS
SYSTOLIC BLOOD PRESSURE: 137 MMHG | WEIGHT: 170 LBS | DIASTOLIC BLOOD PRESSURE: 80 MMHG | BODY MASS INDEX: 23.8 KG/M2 | HEIGHT: 71 IN | HEART RATE: 91 BPM

## 2022-12-12 PROCEDURE — 99214 OFFICE O/P EST MOD 30 MIN: CPT

## 2022-12-12 PROCEDURE — 99072 ADDL SUPL MATRL&STAF TM PHE: CPT

## 2022-12-12 NOTE — HISTORY OF PRESENT ILLNESS
[FreeTextEntry1] : 37 y.o. M w/ c/o left sided LBP after a work-related injury (8/22/22) w/ persistent muscular discomfort left lumbar spine returns to office for f/u. Pt. states that since his last visit in Nov., he has not been able to go to P.T. as our request as been denied by his WC carrier.  He describes persistent lower back "tightness" and "soreness" worse in the AM.  Pt. states that his pain is worse with bending over and lifting (ie, picking up baby daughter from crib).  Has some radiation of pain into left hip but still denies pain radiating down leg.  No N/T/B in legs.  Of note, pt. has not had FCE done but has REINIER scheduled for tomorrow.

## 2022-12-12 NOTE — PHYSICAL EXAM
[FreeTextEntry1] : NAD\par A&Ox3\par Non-obese\par No significant change in today's office visit\par ROM L-spine: near full forward flexion; 15-20' extension w/ pain (static and non-progressive)\par ROM Hips: near full IR/ER w/o pain (tight in left hip IR)\par Pelvic tilt: minimal\par Seated slump test: neg left\par SLR: neg left (static)\par LICHA's: + left (unchanged) \par DTR's: 2+ knees/ankles\par MMT: 5/5 b/l LE (unchanged)\par Sensation: SILT\par Toe & Heel Walk: Yes\par Palpation: left mid and upper lumbar paravertebral hypertonicity/spasm/ttp (largely unchanged)\par

## 2022-12-12 NOTE — ASSESSMENT
[Indicate if, in your opinion, the incident that the patient described was the competent medical cause of this injury/illness.] : The incident that the patient described was the competent medical cause of this injury/illness: Yes [Indicate if the patient's complaints are consistent with his/her history of the injury/illness.] : Indicate if the patient's complaints are consistent with his/her history of the injury/illness: Yes [Yes] : Yes, it is consistent [Physical Disability Temporary Total] : temporarily total disabled [FreeTextEntry1] : 37 y.o. M w/ c/o left sided LBP after a work-related injury (8/22/22) w/ persistent muscular discomfort left lumbar spine.  I spent most of today's office visit (25 min) discussing etiology, pathogenesis and further non-operative management.  MRI previously reviewed c/w broad-based left paracentral/foraminal HNP impinging on the left L2 NR.  Note again is made of a left-sided annular tear.  Explained that imaging finding may be incidental as he does not c/o groin or medial thigh pain w/ N/T/B.  That does not mean; however, that his myofascial back pain does not need to be treated in P.T. at an optimal frequency of 2-3x/week for 6-8 weeks.  In the meantime, we have reviewed a proper home exercise program consisting of walking in the pool for core stabilization exercises.  We reviewed proper bending, lifting and carrying techniques.  Pt. will remain out of work for additional 6 weeks as he has yet to complete a functional capacity evaluation (FCE) to help determine RTW restrictions and/or accommodations.  Pt. is in agreement with plan.  All questions answered.  RTC 6 weeks.    [Can the patient return to usual work activities as indicated? If yes, indicate date___] : The patient cannot return to usual work activities as indicated. [FreeTextEntry5] : 100

## 2022-12-13 ENCOUNTER — FORM ENCOUNTER (OUTPATIENT)
Age: 37
End: 2022-12-13

## 2022-12-21 ENCOUNTER — FORM ENCOUNTER (OUTPATIENT)
Age: 37
End: 2022-12-21

## 2023-02-03 ENCOUNTER — APPOINTMENT (OUTPATIENT)
Dept: PHYSICAL MEDICINE AND REHAB | Facility: CLINIC | Age: 38
End: 2023-02-03
Payer: OTHER MISCELLANEOUS

## 2023-02-03 VITALS
DIASTOLIC BLOOD PRESSURE: 98 MMHG | BODY MASS INDEX: 23.8 KG/M2 | RESPIRATION RATE: 12 BRPM | SYSTOLIC BLOOD PRESSURE: 143 MMHG | HEART RATE: 66 BPM | HEIGHT: 71 IN | WEIGHT: 170 LBS

## 2023-02-03 PROCEDURE — 99072 ADDL SUPL MATRL&STAF TM PHE: CPT

## 2023-02-03 PROCEDURE — 99214 OFFICE O/P EST MOD 30 MIN: CPT

## 2023-02-03 NOTE — PHYSICAL EXAM
[FreeTextEntry1] : NAD\par A&Ox3\par Non-obese\par ROM L-spine: near full forward flexion (static); 20'-25' extension w/ pain endROM (slightly improved)\par ROM Hips: near full IR/ER w/o pain (tight in left hip IR)\par Pelvic tilt: minimal\par Seated slump test: neg left\par SLR: neg left (static)\par LICHA's: +/- left \par DTR's: 2+ knees/ankles (unchanged)\par MMT: 5/5 b/l LE (unchanged)\par Sensation: SILT\par Toe & Heel Walk: Yes\par Palpation: left mid and upper lumbar paravertebral hypertonicity/spasm/ttp (largely unchanged)\par Facet loading: ++ left (more concordant)\par

## 2023-02-03 NOTE — ASSESSMENT
[Indicate if, in your opinion, the incident that the patient described was the competent medical cause of this injury/illness.] : The incident that the patient described was the competent medical cause of this injury/illness: Yes [Indicate if the patient's complaints are consistent with his/her history of the injury/illness.] : Indicate if the patient's complaints are consistent with his/her history of the injury/illness: Yes [Yes] : Yes, it is consistent [Physical Disability Temporary Total] : temporarily total disabled [FreeTextEntry1] : 37 y.o. M w/ c/o left sided LBP after a work-related injury (8/22/22) w/ persistent muscular discomfort left lumbar spine.  I spent most of today's office visit (25 min) discussing etiology, pathogenesis and further non-operative management.  Upon further discussion and reexamination, I feel the patient may have a component of lumbar facet mediated pain.  We discussed the risks, benefits and alternatives to diagnostic lumbar medial branch blocks under fluoroscopy.  The patient understands that if he achieves 80% symptom relief on 2 separate occasions, he may then consider a radiofrequency ablation.  His MRI lumbar spine previously reviewed c/w broad-based left paracentral/foraminal HNP impinging on the left L2 NR but in the absence of groin or medial thigh pain w/ N/T/B; this likely represents an incidental finding.  The patient is pending Worker's Compensation authorization for further physical therapy sessions.  In the meantime, we have reviewed a proper home exercise program consisting of walking in the pool for core stabilization exercises.  We reviewed proper bending, lifting and carrying techniques.  Pt. will remain out of work for additional 6 weeks as he has yet to complete a functional capacity evaluation (FCE) to help determine RTW restrictions and/or accommodations.  Pt. is in agreement with plan.  All questions answered.  RTC 6 weeks.    [Can the patient return to usual work activities as indicated? If yes, indicate date___] : The patient cannot return to usual work activities as indicated. [FreeTextEntry5] : 100
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
## 2023-02-09 ENCOUNTER — APPOINTMENT (OUTPATIENT)
Dept: PHYSICAL MEDICINE AND REHAB | Facility: CLINIC | Age: 38
End: 2023-02-09
Payer: COMMERCIAL

## 2023-02-09 PROCEDURE — 99214 OFFICE O/P EST MOD 30 MIN: CPT

## 2023-02-09 PROCEDURE — 99072 ADDL SUPL MATRL&STAF TM PHE: CPT
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
## 2023-02-09 NOTE — PHYSICAL EXAM
[FreeTextEntry1] : NAD\par A&Ox3\par Non-obese\par Left Wrist\par PROM DF 70'; Palmar Flexion 60'\par Observation: no deformity\par Palpation\par DRUJ TTP (somewhat concordant)\par Anatomical Snuff Box VTTP (most concordant)\par Volar scaphoid TTP\par TFCC NTTP\par ECU tendon NTTP\par Finklestein's + (discordant)\par Tinel's neg\par SILT\par Strong \par \par ROM L-spine: near full forward flexion; 20'-25' extension w/ pain endROM \par ROM Hips: near full IR/ER w/o pain (tight in left hip IR)\par Pelvic tilt: minimal\par Seated slump test: neg left\par SLR: neg left \par LICHA's: +/- left \par DTR's: 2+ knees/ankles \par MMT: 5/5 b/l LE\par Sensation: SILT\par Toe & Heel Walk: Yes\par Palpation: b/l mid and upper lumbar paravertebral hypertonicity/spasm/ttp \par Facet loading: ++ left (more concordant)\par

## 2023-02-14 ENCOUNTER — APPOINTMENT (OUTPATIENT)
Dept: PHYSICAL MEDICINE AND REHAB | Facility: CLINIC | Age: 38
End: 2023-02-14
Payer: COMMERCIAL

## 2023-02-14 DIAGNOSIS — M51.9 UNSPECIFIED THORACIC, THORACOLUMBAR AND LUMBOSACRAL INTERVERTEBRAL DISC DISORDER: ICD-10-CM

## 2023-02-14 PROCEDURE — 76882 US LMTD JT/FCL EVL NVASC XTR: CPT | Mod: LT

## 2023-02-14 PROCEDURE — 99072 ADDL SUPL MATRL&STAF TM PHE: CPT

## 2023-02-14 PROCEDURE — 99214 OFFICE O/P EST MOD 30 MIN: CPT

## 2023-02-14 NOTE — PHYSICAL EXAM
[FreeTextEntry1] : NAD\par A&Ox3\par Non-obese\par No specific change in today's examination\par Left Wrist\par PROM DF 70'; Palmar Flexion 60'\par Observation: no deformity\par Palpation\par DRUJ TTP (somewhat concordant)\par Anatomical Snuff Box VTTP (most concordant)\par Volar scaphoid TTP\par TFCC NTTP\par ECU tendon NTTP\par Finklestein's + (discordant)\par Tinel's neg\par SILT\par Strong \par \par ROM L-spine: near full forward flexion; 20'-25' extension w/ pain endROM \par ROM Hips: near full IR/ER w/o pain (tight in left hip IR)\par Pelvic tilt: minimal\par Seated slump test: neg left\par SLR: neg left \par LICHA's: +/- left \par DTR's: 2+ knees/ankles \par MMT: 5/5 b/l LE\par Sensation: SILT\par Toe & Heel Walk: Yes\par Palpation: b/l mid and upper lumbar paravertebral hypertonicity/spasm/ttp \par Facet loading: ++ left (more concordant)\par

## 2023-02-14 NOTE — PROCEDURE
[de-identified] : MSK US EXAMINATION LEFT WRIST\par \par A small footprint linear transducer was placed over the volar aspect of the patient's left wrist.  Above the level of the carpal inlet, at the distal radius, an oval hypoechoic cyst was seen immediately beneath the radial vessels.  On color Doppler imaging, there was no Doppler flow in the cyst; however, there appeared to be a leaflet connecting the radial vessels to the cystic structure.
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
## 2023-02-14 NOTE — DATA REVIEWED
[MRI] : MRI [FreeTextEntry1] : MRI LEFT WRIST (2/10/23): 1. 1 x 0.7 x 0.5 cm ganglion cyst in the volar aspect of the distal radius and\par radial styloid process.  2. No fracture or acute osseous injury.  3. No tendon tear.
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
## 2023-03-27 ENCOUNTER — APPOINTMENT (OUTPATIENT)
Dept: PHYSICAL MEDICINE AND REHAB | Facility: CLINIC | Age: 38
End: 2023-03-27
Payer: OTHER MISCELLANEOUS

## 2023-03-27 VITALS
WEIGHT: 165 LBS | HEART RATE: 66 BPM | HEIGHT: 71 IN | SYSTOLIC BLOOD PRESSURE: 118 MMHG | DIASTOLIC BLOOD PRESSURE: 80 MMHG | BODY MASS INDEX: 23.1 KG/M2

## 2023-03-27 PROCEDURE — 99214 OFFICE O/P EST MOD 30 MIN: CPT
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
## 2023-04-04 ENCOUNTER — APPOINTMENT (OUTPATIENT)
Dept: PHYSICAL MEDICINE AND REHAB | Facility: CLINIC | Age: 38
End: 2023-04-04
Payer: COMMERCIAL

## 2023-04-04 PROCEDURE — 72170 X-RAY EXAM OF PELVIS: CPT

## 2023-04-04 PROCEDURE — 99214 OFFICE O/P EST MOD 30 MIN: CPT

## 2023-04-04 NOTE — PHYSICAL EXAM
[FreeTextEntry1] : NAD\par A&Ox3\par Non-obese\par No specific change in today's examination\par Left Wrist\par PROM DF 70'; Palmar Flexion 60'\par Observation: no deformity\par Palpation\par DRUJ TTP (somewhat concordant)\par Anatomical Snuff Box VTTP (most concordant)\par Volar scaphoid TTP\par TFCC NTTP\par ECU tendon NTTP\par Finklestein's + (discordant)\par Tinel's neg\par SILT\par Strong \par \par ROM L-spine: near full forward flexion; 20'-25' extension w/ pain endROM \par ROM Hips: near full IR/ER w/o pain (tight in left hip IR)\par Pelvic tilt: minimal\par Seated slump test: neg left\par SLR: neg left \par LICHA's: + left \par FADIR's: ++ left\par DTR's: 2+ knees/ankles \par MMT: 5/5 b/l LE\par Sensation: SILT\par Toe & Heel Walk: Yes\par Facet loading: ++ left (more concordant)\par

## 2023-04-04 NOTE — DATA REVIEWED
[MRI] : MRI [FreeTextEntry1] : X-rays Pelvis and Left Hip (2 views) obtained at today's office visit: There is an abnormal left femoral head neck offset with relatively well-preserved joint spaces.  The sacroiliac joints are well maintained.  The osseous structures show no signs of fracture or osteopenia.\par \par MSK US EXAMINATION LEFT WRIST (2/14/23)\par \par A small footprint linear transducer was placed over the volar aspect of the patient's left wrist.  Above the level of the carpal inlet, at the distal radius, an oval hypoechoic cyst was seen immediately beneath the radial vessels.  On color Doppler imaging, there was no Doppler flow in the cyst; however, there appeared to be a leaflet connecting the radial vessels to the cystic structure.\par \par MRI LEFT WRIST (2/10/23): 1. 1 x 0.7 x 0.5 cm ganglion cyst in the volar aspect of the distal radius and\par radial styloid process.  2. No fracture or acute osseous injury.  3. No tendon tear.\par \par MRI L-spine \par \par T12-L1: There is no significant spinal canal or neural foraminal stenosis.\par \par L1-L2: There is no significant spinal canal or neural foraminal stenosis.\par \par L2-L3 : There is a broad-based left paracentral/foraminal disc protrusion\par impinging upon the left L2 nerve roots. A left-sided annular tear is noted.\par There is mild left lateral recess stenosis with disc material contacting but not\par displacing the left L3 nerve root.\par \par L3-L4 : There is no significant spinal canal or neural foraminal stenosis.\par \par L4-L5: There is a circumferential disc bulge flattening the ventral thecal sac\par and minimally narrowing the right greater than left neural foramen.\par \par L5-S1: There is no significant spinal canal or neural foraminal stenosis. Mild\par facet arthrosis is noted.\par \par The posterior paraspinal muscles are symmetric. \par
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
## 2023-05-05 ENCOUNTER — APPOINTMENT (OUTPATIENT)
Dept: PHYSICAL MEDICINE AND REHAB | Facility: CLINIC | Age: 38
End: 2023-05-05
Payer: COMMERCIAL

## 2023-05-05 VITALS
HEART RATE: 76 BPM | WEIGHT: 170 LBS | DIASTOLIC BLOOD PRESSURE: 76 MMHG | HEIGHT: 71 IN | SYSTOLIC BLOOD PRESSURE: 113 MMHG | RESPIRATION RATE: 16 BRPM | BODY MASS INDEX: 23.8 KG/M2

## 2023-05-05 DIAGNOSIS — M25.532 PAIN IN LEFT WRIST: ICD-10-CM

## 2023-05-05 PROCEDURE — 20611 DRAIN/INJ JOINT/BURSA W/US: CPT | Mod: LT

## 2023-05-05 PROCEDURE — 99214 OFFICE O/P EST MOD 30 MIN: CPT | Mod: 25
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
## 2023-05-05 NOTE — PROCEDURE
[de-identified] : PROCEDURE NOTE\par \par PROCEDURE: US GUIDED LEFT HIP LOCAL ANESTHETIC BLOCK\par PHYSICIAN: BARRIE SHEPARD DO\par MEDICATIONS INJECTED: 5 CC 2% LIDOCAINE\par SEDATION MEDICATIONS: NONE\par BLOOD LOSS: NONE\par COMPLICATIONS: NONE\par \par  \par TECHNIQUE:  R/B/A to USG LEFT hip joint LOCAL ANESTHETIC BLOCK reviewed with patient. The patient is agreeable and wishes to proceed.  Signed consent form to be scanned into EMR.  The patient was placed in SUPINE position. Utilizing ultrasound, the femoral neck, head, femoroacetabular junction, overlying vessels and muscles were identified. The area was prepped in normal sterile fashion with Chloroprep x3.  Local anesthesia with 1% lidocaine was provided with 25-G, 1.5" needle.  After adequate anesthesia was obtained, a 22-G, 3.5" spinal needle was advanced under sonographic guidance toward the femoral head neck junction.  Care was taken to avoid the anterior circumflex femoral artery.  Once the tip of the needle was seen to puncture the capsule, color Doppler flow confirmed capsular distention and the block medication was injected.  The needle was then removed.  A bandaid placed over injection site.  There was no complications.  Post injection instructions provided.

## 2023-05-11 ENCOUNTER — APPOINTMENT (OUTPATIENT)
Dept: PHYSICAL MEDICINE AND REHAB | Facility: CLINIC | Age: 38
End: 2023-05-11

## 2023-05-31 ENCOUNTER — APPOINTMENT (OUTPATIENT)
Dept: PHYSICAL MEDICINE AND REHAB | Facility: CLINIC | Age: 38
End: 2023-05-31
Payer: OTHER MISCELLANEOUS

## 2023-05-31 VITALS
SYSTOLIC BLOOD PRESSURE: 117 MMHG | DIASTOLIC BLOOD PRESSURE: 80 MMHG | RESPIRATION RATE: 16 BRPM | BODY MASS INDEX: 23.8 KG/M2 | WEIGHT: 170 LBS | HEIGHT: 71 IN | HEART RATE: 80 BPM

## 2023-05-31 PROCEDURE — 99214 OFFICE O/P EST MOD 30 MIN: CPT
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
## 2023-07-13 ENCOUNTER — APPOINTMENT (OUTPATIENT)
Dept: PHYSICAL MEDICINE AND REHAB | Facility: CLINIC | Age: 38
End: 2023-07-13
Payer: OTHER MISCELLANEOUS

## 2023-07-13 VITALS
RESPIRATION RATE: 12 BRPM | DIASTOLIC BLOOD PRESSURE: 74 MMHG | WEIGHT: 165 LBS | BODY MASS INDEX: 23.1 KG/M2 | SYSTOLIC BLOOD PRESSURE: 117 MMHG | HEART RATE: 62 BPM | HEIGHT: 71 IN

## 2023-07-13 DIAGNOSIS — M54.50 LOW BACK PAIN, UNSPECIFIED: ICD-10-CM

## 2023-07-13 PROCEDURE — 99213 OFFICE O/P EST LOW 20 MIN: CPT
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
## 2023-07-23 ENCOUNTER — FORM ENCOUNTER (OUTPATIENT)
Age: 38
End: 2023-07-23

## 2023-09-07 ENCOUNTER — APPOINTMENT (OUTPATIENT)
Dept: PHYSICAL MEDICINE AND REHAB | Facility: CLINIC | Age: 38
End: 2023-09-07
Payer: OTHER MISCELLANEOUS

## 2023-09-07 VITALS
DIASTOLIC BLOOD PRESSURE: 75 MMHG | SYSTOLIC BLOOD PRESSURE: 120 MMHG | HEART RATE: 52 BPM | BODY MASS INDEX: 23.1 KG/M2 | HEIGHT: 71 IN | RESPIRATION RATE: 12 BRPM | WEIGHT: 165 LBS

## 2023-09-07 DIAGNOSIS — M47.816 SPONDYLOSIS W/OUT MYELOPATHY OR RADICULOPATHY, LUMBAR REGION: ICD-10-CM

## 2023-09-07 PROCEDURE — 99214 OFFICE O/P EST MOD 30 MIN: CPT
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
## 2023-09-07 NOTE — PHYSICAL EXAM
[FreeTextEntry1] : NAD A&Ox3 Non-obese ROM L-spine: near full forward flexion (static); 20'-25' extension w/ pain endROM (static) ROM Hips: near full IR/ER w/o pain (tight in left hip IR) Pelvic tilt: minimal Seated slump test: neg left SLR: neg left (static) LICHA's: +/- left  FADIR's: + left (static) DTR's: 2+ knees/ankles (unchanged) MMT: 5/5 b/l LE (unchanged) Sensation: SILT Toe & Heel Walk: Yes Palpation: left mid and upper lumbar paravertebral hypertonicity/spasm/ttp (largely unchanged) Facet loading: ++ left (more concordant)
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
## 2023-09-07 NOTE — DATA REVIEWED
[MRI] : MRI [FreeTextEntry1] : MRI L-spine   T12-L1: There is no significant spinal canal or neural foraminal stenosis.  L1-L2: There is no significant spinal canal or neural foraminal stenosis.  L2-L3 : There is a broad-based left paracentral/foraminal disc protrusion impinging upon the left L2 nerve roots. A left-sided annular tear is noted. There is mild left lateral recess stenosis with disc material contacting but not displacing the left L3 nerve root.  L3-L4 : There is no significant spinal canal or neural foraminal stenosis.  L4-L5: There is a circumferential disc bulge flattening the ventral thecal sac and minimally narrowing the right greater than left neural foramen.  L5-S1: There is no significant spinal canal or neural foraminal stenosis. Mild facet arthrosis is noted.  The posterior paraspinal muscles are symmetric.

## 2023-09-12 ENCOUNTER — APPOINTMENT (OUTPATIENT)
Dept: PHYSICAL MEDICINE AND REHAB | Facility: CLINIC | Age: 38
End: 2023-09-12